# Patient Record
Sex: FEMALE | Race: BLACK OR AFRICAN AMERICAN | NOT HISPANIC OR LATINO | Employment: UNEMPLOYED | URBAN - METROPOLITAN AREA
[De-identification: names, ages, dates, MRNs, and addresses within clinical notes are randomized per-mention and may not be internally consistent; named-entity substitution may affect disease eponyms.]

---

## 2020-01-15 PROBLEM — M25.811 MASS OF JOINT OF RIGHT SHOULDER: Status: ACTIVE | Noted: 2018-06-27

## 2020-01-15 PROBLEM — M47.26 OTHER SPONDYLOSIS WITH RADICULOPATHY, LUMBAR REGION: Status: ACTIVE | Noted: 2017-04-20

## 2020-01-15 PROBLEM — I63.9 ISCHEMIC STROKE: Status: ACTIVE | Noted: 2019-01-21

## 2020-01-15 PROBLEM — M25.559 ARTHRALGIA OF HIP: Status: ACTIVE | Noted: 2017-04-20

## 2020-01-15 PROBLEM — M21.071 VALGUS DEFORMITY OF FOOT, RIGHT: Status: ACTIVE | Noted: 2018-11-27

## 2020-01-15 PROBLEM — E11.9 CONTROLLED TYPE 2 DIABETES MELLITUS WITHOUT COMPLICATION, WITHOUT LONG-TERM CURRENT USE OF INSULIN: Status: ACTIVE | Noted: 2020-01-15

## 2020-01-15 PROBLEM — I25.10 ATHEROSCLEROTIC HEART DISEASE OF NATIVE CORONARY ARTERY WITHOUT ANGINA PECTORIS: Status: ACTIVE | Noted: 2017-03-22

## 2020-01-15 PROBLEM — E66.01 MORBID OBESITY: Status: ACTIVE | Noted: 2020-01-15

## 2020-01-15 PROBLEM — I50.9 CONGESTIVE HEART FAILURE: Status: ACTIVE | Noted: 2020-01-15

## 2020-01-15 PROBLEM — D17.22 LIPOMA OF LEFT UPPER EXTREMITY: Status: ACTIVE | Noted: 2020-01-15

## 2020-01-15 PROBLEM — M17.11 UNILATERAL PRIMARY OSTEOARTHRITIS, RIGHT KNEE: Status: ACTIVE | Noted: 2017-03-22

## 2020-01-15 PROBLEM — K21.9 GASTROESOPHAGEAL REFLUX DISEASE WITHOUT ESOPHAGITIS: Status: ACTIVE | Noted: 2018-04-27

## 2020-01-15 PROBLEM — E78.5 HYPERLIPEMIA: Status: ACTIVE | Noted: 2020-01-15

## 2020-01-15 PROBLEM — I10 ESSENTIAL (PRIMARY) HYPERTENSION: Status: ACTIVE | Noted: 2017-04-11

## 2020-01-15 PROBLEM — N18.9 CKD (CHRONIC KIDNEY DISEASE): Status: ACTIVE | Noted: 2020-01-15

## 2020-01-15 PROBLEM — M19.90 ARTHRITIS: Status: ACTIVE | Noted: 2020-01-15

## 2021-04-19 PROBLEM — N93.9 ABNORMAL UTERINE BLEEDING (AUB): Status: ACTIVE | Noted: 2021-04-19

## 2021-11-08 PROBLEM — R60.0 LOWER EXTREMITY EDEMA: Status: ACTIVE | Noted: 2021-11-08

## 2021-11-09 PROBLEM — R06.09 DOE (DYSPNEA ON EXERTION): Status: ACTIVE | Noted: 2021-11-09

## 2021-11-10 PROBLEM — R06.09 DOE (DYSPNEA ON EXERTION): Status: RESOLVED | Noted: 2021-11-09 | Resolved: 2021-11-10

## 2022-08-17 ENCOUNTER — PATIENT OUTREACH (OUTPATIENT)
Dept: EMERGENCY MEDICINE | Facility: OTHER | Age: 57
End: 2022-08-17
Payer: MEDICAID

## 2022-08-22 NOTE — PROGRESS NOTES
Spoke to patient today and she stated she has not received mailed resources, but the mail person has been delivering mail to the wrong addresses so she is not sure if that is what happened. Verified address again with patient and am mailing out this week. Pt had no additional needs at this time. Instructed pt to call with any future needs/concerns and she verbalized understanding.

## 2022-12-16 ENCOUNTER — PATIENT OUTREACH (OUTPATIENT)
Dept: EMERGENCY MEDICINE | Facility: OTHER | Age: 57
End: 2022-12-16

## 2023-05-31 NOTE — PROGRESS NOTES
ED Navigator has been unsuccessful with attempt to F/U with patient. This ED Navigator is no longer following this area for ED Navigation. Closing encounter as date is approaching for reassessment of needs.